# Patient Record
Sex: FEMALE | Race: WHITE | NOT HISPANIC OR LATINO | Employment: OTHER | URBAN - METROPOLITAN AREA
[De-identification: names, ages, dates, MRNs, and addresses within clinical notes are randomized per-mention and may not be internally consistent; named-entity substitution may affect disease eponyms.]

---

## 2020-09-15 ENCOUNTER — HOSPITAL ENCOUNTER (OUTPATIENT)
Dept: OTHER | Facility: HOSPITAL | Age: 35
Discharge: HOME | End: 2020-09-15
Attending: FAMILY MEDICINE

## 2020-09-15 ENCOUNTER — OFFICE VISIT CONVERTED (OUTPATIENT)
Dept: FAMILY MEDICINE CLINIC | Age: 35
End: 2020-09-15
Attending: FAMILY MEDICINE

## 2020-09-15 LAB
ALBUMIN SERPL-MCNC: 4.7 G/DL (ref 3.5–5)
ALBUMIN/GLOB SERPL: 1.7 {RATIO} (ref 1.4–2.6)
ALP SERPL-CCNC: 81 U/L (ref 42–98)
ALT SERPL-CCNC: 28 U/L (ref 10–40)
ANION GAP SERPL CALC-SCNC: 18 MMOL/L (ref 8–19)
AST SERPL-CCNC: 19 U/L (ref 15–50)
BASOPHILS # BLD MANUAL: 0.02 10*3/UL (ref 0–0.2)
BASOPHILS NFR BLD MANUAL: 0.3 % (ref 0–3)
BILIRUB SERPL-MCNC: 0.26 MG/DL (ref 0.2–1.3)
BUN SERPL-MCNC: 7 MG/DL (ref 5–25)
BUN/CREAT SERPL: 9 {RATIO} (ref 6–20)
CALCIUM SERPL-MCNC: 9.3 MG/DL (ref 8.7–10.4)
CHLORIDE SERPL-SCNC: 101 MMOL/L (ref 99–111)
CHOLEST SERPL-MCNC: 198 MG/DL (ref 107–200)
CHOLEST/HDLC SERPL: 4.4 {RATIO} (ref 3–6)
CONV CO2: 25 MMOL/L (ref 22–32)
CONV TOTAL PROTEIN: 7.4 G/DL (ref 6.3–8.2)
CREAT UR-MCNC: 0.75 MG/DL (ref 0.5–0.9)
DEPRECATED RDW RBC AUTO: 42.3 FL
EOSINOPHIL # BLD MANUAL: 0.06 10*3/UL (ref 0–0.7)
EOSINOPHIL NFR BLD MANUAL: 0.8 % (ref 0–7)
ERYTHROCYTE [DISTWIDTH] IN BLOOD BY AUTOMATED COUNT: 13 % (ref 11.5–14.5)
GFR SERPLBLD BASED ON 1.73 SQ M-ARVRAT: >60 ML/MIN/{1.73_M2}
GLOBULIN UR ELPH-MCNC: 2.7 G/DL (ref 2–3.5)
GLUCOSE SERPL-MCNC: 95 MG/DL (ref 65–99)
GRANS (ABSOLUTE): 4.53 10*3/UL (ref 2–8)
GRANS: 60.9 % (ref 30–85)
HBA1C MFR BLD: 12.8 G/DL (ref 12–16)
HCT VFR BLD AUTO: 38.4 % (ref 37–47)
HDLC SERPL-MCNC: 45 MG/DL (ref 40–60)
IMM GRANULOCYTES # BLD: 0.01 10*3/UL (ref 0–0.54)
IMM GRANULOCYTES NFR BLD: 0.1 % (ref 0–0.43)
LDLC SERPL CALC-MCNC: 134 MG/DL (ref 70–100)
LYMPHOCYTES # BLD MANUAL: 2.32 10*3/UL (ref 1–5)
LYMPHOCYTES NFR BLD MANUAL: 6.8 % (ref 3–10)
MCH RBC QN AUTO: 29.2 PG (ref 27–31)
MCHC RBC AUTO-ENTMCNC: 33.3 G/DL (ref 33–37)
MCV RBC AUTO: 87.5 FL (ref 81–99)
MONOCYTES # BLD AUTO: 0.51 10*3/UL (ref 0.2–1.2)
OSMOLALITY SERPL CALC.SUM OF ELEC: 288 MOSM/KG (ref 273–304)
PLATELET # BLD AUTO: 373 10*3/UL (ref 130–400)
PMV BLD AUTO: 10.3 FL (ref 7.4–10.4)
POTASSIUM SERPL-SCNC: 4 MMOL/L (ref 3.5–5.3)
RBC # BLD AUTO: 4.39 10*6/UL (ref 4.2–5.4)
SODIUM SERPL-SCNC: 140 MMOL/L (ref 135–147)
TRIGL SERPL-MCNC: 94 MG/DL (ref 40–150)
TSH SERPL-ACNC: 0.76 M[IU]/L (ref 0.27–4.2)
VARIANT LYMPHS NFR BLD MANUAL: 31.1 % (ref 20–45)
VLDLC SERPL-MCNC: 19 MG/DL (ref 5–37)
WBC # BLD AUTO: 7.45 10*3/UL (ref 4.8–10.8)

## 2020-12-08 ENCOUNTER — OFFICE VISIT CONVERTED (OUTPATIENT)
Dept: FAMILY MEDICINE CLINIC | Age: 35
End: 2020-12-08
Attending: FAMILY MEDICINE

## 2020-12-08 ENCOUNTER — HOSPITAL ENCOUNTER (OUTPATIENT)
Dept: OTHER | Facility: HOSPITAL | Age: 35
Discharge: HOME | End: 2020-12-08
Attending: FAMILY MEDICINE

## 2020-12-15 ENCOUNTER — HOSPITAL ENCOUNTER (OUTPATIENT)
Dept: PHYSICAL THERAPY | Facility: CLINIC | Age: 35
Setting detail: RECURRING SERIES
Discharge: HOME | End: 2021-03-03
Attending: FAMILY MEDICINE

## 2021-02-02 ENCOUNTER — OFFICE VISIT CONVERTED (OUTPATIENT)
Dept: FAMILY MEDICINE CLINIC | Age: 36
End: 2021-02-02
Attending: FAMILY MEDICINE

## 2021-05-18 NOTE — PROGRESS NOTES
Josefa Jacques  1985     Office/Outpatient Visit    Visit Date: Tue, Dec 8, 2020 12:53 pm    Provider: Rambo Puri MD (Assistant: Kavitha Baptiste MA)    Location: [facility]        Electronically signed by Rambo Puri MD on  2020 01:52:28 PM                             Subjective:        CC: Ms. Jacques is a 35 year old White female.  right shoulder pain, going down into elbow, going on 5+ months;         HPI:       Josefa presents to clinic today for evaluation of right shoulder pain.  This pain has been present for approximately 5 months but seem to be getting progressively worse.  Pain is primary located over the anterior right shoulder but does radiate anteriorly down to the right forearm, posteriorly to the elbow and posteriorly to her side/axilla.  She denies any known inciting event or injury.  No prior history of injury or trauma.  She says she is been going to the chiropractor told her she likely has bursitis.  She notes that chiropractic adjustments do seem to help her.  She intermittently takes ibuprofen for pain this is minimally helpful.  No paresthesias.  No weakness.  Pain seems to be worse at night while lying on her side.    ROS:     CONSTITUTIONAL:  Negative for chills, fatigue, fever, and weight change.      CARDIOVASCULAR:  Negative for chest pain, dizziness, palpitations and edema.      RESPIRATORY:  Negative for dyspnea and cough.      MUSCULOSKELETAL:  Positive for right shoulder pain.      INTEGUMENTARY/BREAST:  Negative for rash, breast mass and skin changes of breast.      NEUROLOGICAL:  Negative for headaches, paresthesias and weakness.          Past Medical History / Family History / Social History:         Last Reviewed on 2020 01:52 PM by Rambo Puri    Past Medical History:             PAST MEDICAL HISTORY     UNREMARKABLE Hospitalizations: Never         GYNECOLOGICAL HISTORY:        Current method of contraception is condoms;          "PREVENTIVE HEALTH MAINTENANCE             MAMMOGRAM: Done within last 2 years and results in are chart was last done  with normal results     PAP SMEAR: was last done  with normal results         Surgical History:          section: X 1; ;     breast reduction ;         Family History:     Unremarkable Father: Healthy     Mother: Healthy     Brother(s): Healthy; 2 brother(s) total     Sister(s): Healthy; 2 sister(s) total         Social History:     Occupation: [employer]     Marital Status: Single     Children: 1 child         Tobacco/Alcohol/Supplements:     Last Reviewed on 2020 01:52 PM by Rambo Puri    Tobacco: She has never smoked.          Alcohol: Frequency: Socially         Substance Abuse History:     Last Reviewed on 2020 01:52 PM by Rambo Puri        Mental Health History:     Last Reviewed on 2020 01:52 PM by Rambo Puri        Communicable Diseases (eg STDs):     Last Reviewed on 2020 01:52 PM by Rambo Puri        Current Problems:     Last Reviewed on 2020 01:52 PM by Rambo Puri    Other constipation    Chest pain, unspecified    Postnasal drip    Encounter for screening for depression    Pain in right shoulder        Immunizations:     None        Allergies:     Last Reviewed on 2020 01:52 PM by Rambo Puri    No Known Allergies.        Current Medications:     Last Reviewed on 2020 01:52 PM by Rambo Puri    None        Objective:        Vitals:         Current: 2020 1:00:00 PM    Ht:  5 ft, 4 in;  Wt: 201.2 lbs;  BMI: 34.5T: 97.8 F (temporal);  BP: 137/77 mm Hg (left arm, sitting);  P: 103 bpm (left arm (BP Cuff), sitting);  sCr: 0.75 mg/dL;  GFR: 116.98        Exams:     PHYSICAL EXAM:     GENERAL: vital signs recorded - well developed, well nourished;  no apparent distress;     EYES: conjunctiva and cornea are normal;     RESPIRATORY: Clear to auscultation bilaterally; no rales (\"crackles\") present; no rhonchi; no " wheezes;     CARDIOVASCULAR: normal rate; rhythm is regular;  No murmurs. clicks, gallops or rubs appreciated; no edema;     BREAST/INTEGUMENT: No significant rashes, lesions or suspicious moles within limits of examination;     MUSCULOSKELETAL: Tenderness to palpation of coracoid process; Right shoulder: Full range of motion in abduction, adduction, internal rotation and external rotation; mild weakness noted on strength testing in adduction and extension; speeds test equivocal; empty can test negative     NEUROLOGIC: Grossly intact; mental status: alert and oriented x 3;     PSYCHIATRIC: appropriate affect and demeanor; normal speech pattern; Normal behavior;         Assessment:         M25.511   Pain in right shoulder           ORDERS:         Radiology/Test Orders:       [id number]  Right Radiologic exam, shoulder; comp, 2 views  (Send-Out)              Procedures Ordered:       RFPT  Physical/Occupational Therapy Referral  (Send-Out)                      Plan:         Pain in right shoulder- Differential diagnosis includes but is not limited to rotator cuff tendinopathy versus rotator cuff tear (acute versus chronic) versus subacromial bursitis versus biceps tendinitis versus biceps tear (acute vs chronic). Plain films ordered today for further evaluation. PT referral placed.  Pain control with Tylenol 1000 mg 3 times daily as needed and ibuprofen 800 mg 3 times daily as needed.  If symptoms persist, will consider more advanced imaging and/or specialist referral.        RADIOLOGY:  I have ordered Shoulder x-ray: right shoulder x-ray to be done today.      REFERRALS:  Referral initiated to physical therapy.            Orders:       [id number]  Right Radiologic exam, shoulder; comp, 2 views  (Send-Out)            RFPT  Physical/Occupational Therapy Referral  (Send-Out)                  Charge Capture:         Primary Diagnosis:     M25.511  Pain in right shoulder           Orders:      [id number]  Office/outpatient visit;  established patient, level 3  (In-House)                  ADDENDUMS:      ____________________________________    Addendum: 12/10/2020 08:55 Rambo Au        Orders:    Remove  [id number]    Add  [id number]    -mja

## 2021-05-18 NOTE — PROGRESS NOTES
Josefa Jacques  1985     Office/Outpatient Visit    Visit Date:  01:29 pm    Provider: Rambo Galdamez MD (Assistant: Yenifer Stack, )    Location: [facility]        Electronically signed by Rambo Galdamez MD on  2021 02:45:56 PM                             Subjective:        CC: Ms. Jacques is a 35 year old White female.  pt feels like something is stuck in her throat;         HPI:       Since Saturday has felt like something is in her throat on right side. Notices it every time she swallows. Has only minimal congestion so doesn't think related to that. No cough, no fever. Did eat fresh on Friday that they had caught themselves, so could of have swallowed piece with bones.    ROS:     CONSTITUTIONAL:  Negative for chills, fatigue, fever, and weight change.      EYES:  Negative for blurred vision.      CARDIOVASCULAR:  Negative for chest pain, orthopnea, paroxysmal nocturnal dyspnea and pedal edema.      RESPIRATORY:  Negative for dyspnea.      GASTROINTESTINAL:  Negative for abdominal pain, constipation, diarrhea, nausea and vomiting.      GENITOURINARY:  Negative for dysuria and frequent urination.      NEUROLOGICAL:  Negative for dizziness, headaches, paresthesias, and weakness.      PSYCHIATRIC:  Negative for anxiety, depression, and sleep disturbances.          Past Medical History / Family History / Social History:         Last Reviewed on 2020 01:52 PM by Rambo Puri    Past Medical History:             PAST MEDICAL HISTORY     UNREMARKABLE Hospitalizations: Never         GYNECOLOGICAL HISTORY:        Current method of contraception is condoms;         PREVENTIVE HEALTH MAINTENANCE             MAMMOGRAM: Done within last 2 years and results in are chart was last done  with normal results     PAP SMEAR: was last done  with normal results         Surgical History:          section: X 1; ;     breast reduction ;         Family  History:     Unremarkable Father: Healthy     Mother: Healthy     Brother(s): Healthy; 2 brother(s) total     Sister(s): Healthy; 2 sister(s) total         Social History:     Occupation: [employer]     Marital Status: Single     Children: 1 child         Tobacco/Alcohol/Supplements:     Last Reviewed on 12/08/2020 01:52 PM by Rambo Puri    Tobacco: She has never smoked.          Alcohol: Frequency: Socially         Substance Abuse History:     Last Reviewed on 12/08/2020 01:52 PM by Rambo Puri        Mental Health History:     Last Reviewed on 12/08/2020 01:52 PM by Rambo Puri        Communicable Diseases (eg STDs):     Last Reviewed on 12/08/2020 01:52 PM by Rambo Puri        Allergies:     Last Reviewed on 12/08/2020 01:52 PM by Rambo Puri    No Known Allergies.        Current Medications:     Last Reviewed on 12/08/2020 01:52 PM by Rambo Puri    None        Objective:        Vitals:         Current: 2/2/2021 1:32:05 PM    Ht:  5 ft, 4 in;  Wt: 201 lbs;  BMI: 34.5T: 97.6 F (temporal);  BP: 135/81 mm Hg (right arm, sitting);  P: 85 bpm (right arm (BP Cuff), sitting);  sCr: 0.75 mg/dL;  GFR: 116.93O2 Sat: 99 % (room air)        Exams:     PHYSICAL EXAM:     GENERAL: well developed, well nourished;  well groomed;  no apparent distress;     EYES: nonicteric;     E/N/T: EARS:  normal external auditory canals and tympanic membranes;  grossly normal hearing; OROPHARYNX: posterior pharynx shows Right tonsil is enlarged, no exudate;     NECK:  supple, full ROM; no thyromegaly; no carotid bruits;     RESPIRATORY: normal appearance and symmetric expansion of chest wall; normal respiratory rate and pattern with no distress; normal breath sounds with no rales, rhonchi, wheezes or rubs;     CARDIOVASCULAR: normal rate; rhythm is regular;  no systolic murmur;     LYMPHATIC: no enlargement of cervical or facial nodes; no supraclavicular nodes;     MUSCULOSKELETAL: no pedal edema;     NEUROLOGIC: no focal  lateralizing deficits.;     PSYCHIATRIC:  appropriate affect and demeanor; normal speech pattern; grossly normal memory;         Assessment:         R13.10   Dysphagia, unspecified           ORDERS:         Procedures Ordered:       REFER  Referral to Specialist or Other Facility  (Send-Out)                      Plan:         Dysphagia, unspecifiedI wonder if she might have had a fish bone get stuck in her throat.  Will send to ENT and let them evaluate with laryngoscopy        REFERRALS:  Referral initiated to an E/N/T ( for evaluation of Globus Sensation- Possible Fish Bone ).            Orders:       REFER  Referral to Specialist or Other Facility  (Send-Out)                  Charge Capture:         Primary Diagnosis:     R13.10  Dysphagia, unspecified           Orders:      [id number]  Office/outpatient visit; established patient, level 3  (In-House)                  ADDENDUMS:      ____________________________________    Addendum: 02/04/2021 10:20 AM - Three, Team         Visit Note Faxed to:             Addendum: 02/04/2021 10:20 AM - Three, Team         Visit Note Faxed to:        Gio Parada  (Otolaryngology); Number [phone]       Gio Parada  (Otolaryngology); Number [phone]

## 2021-05-18 NOTE — PROGRESS NOTES
"Josefa Jacques  1985     Office/Outpatient Visit    Visit Date: Tue, Sep 15, 2020 02:11 pm    Provider: Rambo Puri MD (Assistant: Theresa Arrieta LPN)    Location: [facility]        Electronically signed by Rambo Puri MD on  09/15/2020 03:44:57 PM                             Subjective:        CC: Ms. Jacqeus is a 35 year old White female.  This is her first visit to the clinic.          HPI: Josefa presents clinic today to establish care.  Overall, she says she is in good health but she does have some acute complaints today.  First, she says she has been experiencing intermittent chest discomfort.  This happens randomly and resolve spontaneously.  She has no prior family history of cardiac disease.  No associated shortness of breath, diaphoresis or nausea.  She says the pain is primary located just to the left of her sternum but has occurred over the inferior sternum as well as on the right side of the chest.  No recent trauma to the area.  However, she notes that she has been moving a lot of boxes at work recently. No pleuritic chest pain.  No orthopnea. No palpitations or edema. She has noticed no association of her chest pain to stressful situations.    Second, Josefa reports that she has had a uncomfortable sensation in her anterior neck as well as a sensation like she has a \"ball\" in her throat.  She says she frequently feels a sensation that she needs to swallow her that she needs to cough something up.  She endorses feeling like her head is congested.  This mostly occurs at night.  She denies sinus congestion or headache.  She does have intermittent scantly productive cough.  No fever or chills.          PHQ-9 Depression Screening: Completed form scanned and in chart; Total Score 8     ROS:     CONSTITUTIONAL:  Negative for chills, fatigue, fever, and weight change.      EYES:  Negative for blurred vision.      E/N/T:  Positive for nasal congestion, globus sensation and " postnasal drip.   Negative for ear pain, tinnitus, frequent rhinorrhea or sinus pressure.      CARDIOVASCULAR:  Positive for chest pain ( unrelated to exertion ).   Negative for dizziness, palpitations or edema.      RESPIRATORY:  Positive for cough.   Negative for dyspnea or pleuritic chest pain.      GASTROINTESTINAL:  Negative for abdominal pain, diarrhea, heartburn, nausea and vomiting.      GENITOURINARY:  Negative for dysuria, hematuria and polyuria.      HEMATOLOGIC/LYMPHATIC:  Negative for easy bruising and excessive bleeding.      MUSCULOSKELETAL:  Negative for arthralgias and myalgias.      INTEGUMENTARY/BREAST:  Negative for rash, breast mass and skin changes of breast.      NEUROLOGICAL:  Negative for headaches, paresthesias and weakness.      ENDOCRINE:  Negative for hair loss, heat/cold intolerance, polydipsia, and polyphagia.      ALLERGIC/IMMUNOLOGIC:  Positive for seasonal allergies.      PSYCHIATRIC:  Negative for anxiety, depression, and sleep disturbances.          Past Medical History / Family History / Social History:         Last Reviewed on 9/15/2020 03:44 PM by Rambo Puri    Past Medical History:             PAST MEDICAL HISTORY     UNREMARKABLE Hospitalizations: Never         GYNECOLOGICAL HISTORY:        Current method of contraception is condoms;         PREVENTIVE HEALTH MAINTENANCE             MAMMOGRAM: Done within last 2 years and results in are chart was last done  with normal results     PAP SMEAR: was last done  with normal results         Surgical History:          section: X 1; ;     breast reduction ;         Family History:     Unremarkable Father: Healthy     Mother: Healthy     Brother(s): Healthy; 2 brother(s) total     Sister(s): Healthy; 2 sister(s) total         Social History:     Occupation: [employer]     Marital Status: Single     Children: 1 child         Tobacco/Alcohol/Supplements:     Last Reviewed on 9/15/2020 03:44 PM by Jaxson  "Rambo    Tobacco: She has never smoked.          Alcohol: Frequency: Socially         Substance Abuse History:     Last Reviewed on 9/15/2020 03:44 PM by Rambo Puri        Mental Health History:     Last Reviewed on 9/15/2020 03:44 PM by Rambo Puri        Communicable Diseases (eg STDs):     Last Reviewed on 9/15/2020 03:44 PM by Rambo Puri        Current Problems:     Last Reviewed on 9/15/2020 03:44 PM by Rambo Puri    Other constipation    Chest pain, unspecified    Postnasal drip    Encounter for screening for depression        Immunizations:     None        Allergies:     Last Reviewed on 9/15/2020 03:44 PM by Rambo Puri    No Known Allergies.        Current Medications:     Last Reviewed on 9/15/2020 03:44 PM by Rambo Puri    None        Objective:        Vitals:         Current: 9/15/2020 2:16:12 PM    Ht:  5 ft, 4 in;  Wt: 201.2 lbs;  BMI: 34.5T: 96.9 F (oral);  BP: 127/80 mm Hg (left arm, sitting);  P: 85 bpm (left arm (BP Cuff), sitting);  sCr: 0.9 mg/dL;  GFR: 97.48        Exams:     PHYSICAL EXAM:     GENERAL: vital signs recorded - well developed, well nourished;  no apparent distress;     EYES: conjunctiva and cornea are normal;     E/N/T: EARS: both TMs are have fluid behind them;  NOSE: nasal mucosa is partially obscured by clear drainage;  no sinus tenderness; OROPHARYNX: posterior pharynx shows no exudate and erythema;     NECK: trachea is midline; thyroid is non-palpable;     RESPIRATORY: Clear to auscultation bilaterally; no rales (\"crackles\") present; no rhonchi; no wheezes;     CARDIOVASCULAR: normal rate; rhythm is regular;  No murmurs. clicks, gallops or rubs appreciated; no edema;     GASTROINTESTINAL: nontender; Soft and nondistended; normal bowel sounds; no organomegaly; no masses;     LYMPHATIC: no enlargement of cervical or facial nodes; no supraclavicular nodes;     BREAST/INTEGUMENT: No significant rashes, lesions or suspicious moles within limits of examination;    "  MUSCULOSKELETAL: muscle strength: 5/5 in all major muscle groups;  normal overall tone     NEUROLOGIC: Grossly intact; mental status: alert and oriented x 3;     PSYCHIATRIC: appropriate affect and demeanor; normal speech pattern; Normal behavior;         Lab/Test Results:         LABORATORY RESULTS: EKG performed by pr         Assessment:         R07.9   Chest pain, unspecified       R09.82   Postnasal drip       Z13.31   Encounter for screening for depression           ORDERS:         Radiology/Test Orders:       [id number]  Radiologic exam chest 2 views  (Send-Out)            92710  Electrocardiogram, routine with at least 12 leads; with interpretation and report  (In-House)              Lab Orders:       [id number]  [facility] CBC with 3 part diff  (Send-Out)            16600  COMP - [facility] Comp. Metabolic Panel  (Send-Out)            65327  TSH - [facility] TSH  (Send-Out)            38890  [facility] - [facility] Lipid Panel  (Send-Out)              Other Orders:         Depression screen negative  (In-House)                      Plan:         Chest pain, unspecified- Differential diagnosis includes but is not limited to cardiac etiology versus pulmonary etiology versus costochondritis versus GERD versus esophageal spasm versus anxiety.  Cardiac etiology seems less likely as EKG is unremarkable in office today and she lacks risk factors.  At this time, costochondritis versus GERD versus anxiety seems most likely.  Chest x-ray and basic labs ordered today for further work-up. ED/return precautions given.     LABORATORY:  Labs ordered to be performed today include CBC, Comprehensive metabolic panel, lipid panel, and TSH.      RADIOLOGY:  I have ordered a chest x-ray (PA and lateral) to be done today.      TESTS/PROCEDURES:  Will proceed with an ECG to be performed/scheduled now.            Orders:       [id number]  Radiologic exam chest 2 views  (Send-Out)            72295  Electrocardiogram, routine with at least 12 leads; with  interpretation and report  (In-House)            80160  [facility] CBC with 3 part diff  (Send-Out)            36319  COMP - [facility] Comp. Metabolic Panel  (Send-Out)            93210  TSH - [facility] TSH  (Send-Out)            07585  [facility] Lipid Panel  (Send-Out)              Postnasal drip- Description of symptoms and physical examination is concerning for postnasal drip/allergic rhinitis.  Will start an over-the-counter antihistamine (Claritin) and Flonase daily.  If symptoms persist, will consider further work-up and/or referral.        Encounter for screening for depression    MIPS PHQ-9 Depression Screening: Completed form scanned and in chart; Total Score 8; Positive Depression Screen but after further evaluation the patient does not have a diagnosis of depression.            Orders:         Depression screen negative  (In-House)                  Charge Capture:         Primary Diagnosis:     R07.9  Chest pain, unspecified           Orders:      40490  Office visit - new pt, level 3  (In-House)            42404  Electrocardiogram, routine with at least 12 leads; with interpretation and report  (In-House)              R09.82  Postnasal drip     Z13.31  Encounter for screening for depression           Orders:        Depression screen negative  (In-House)

## 2021-07-02 VITALS
DIASTOLIC BLOOD PRESSURE: 80 MMHG | TEMPERATURE: 96.9 F | BODY MASS INDEX: 34.35 KG/M2 | HEIGHT: 64 IN | SYSTOLIC BLOOD PRESSURE: 127 MMHG | HEART RATE: 85 BPM | WEIGHT: 201.2 LBS

## 2021-07-02 VITALS
HEIGHT: 64 IN | OXYGEN SATURATION: 99 % | SYSTOLIC BLOOD PRESSURE: 135 MMHG | WEIGHT: 201 LBS | DIASTOLIC BLOOD PRESSURE: 81 MMHG | TEMPERATURE: 97.6 F | HEART RATE: 85 BPM | BODY MASS INDEX: 34.31 KG/M2

## 2021-07-02 VITALS
HEIGHT: 64 IN | HEART RATE: 103 BPM | DIASTOLIC BLOOD PRESSURE: 77 MMHG | SYSTOLIC BLOOD PRESSURE: 137 MMHG | WEIGHT: 201.2 LBS | TEMPERATURE: 97.8 F | BODY MASS INDEX: 34.35 KG/M2

## 2024-01-23 NOTE — PROGRESS NOTES
"Chief Complaint  Establish Care and Sore Throat (Pt c/o sore throat, headache./Onset since yesterday/)    Subjective          Josefa Jacques presents to [facility]  History of Present Illness  Patient is here to reestablish care after period of absence.    She reports that she was taking Ozempic through someone that she knew, and she stopped taking it as she was not prescribed the medication.  She reports that she felt good while taking the medication.  She did report that it caused constipation.  She went to [facility] and [place], and they recommended that she start Mounjaro.  URI   This is a new problem. The current episode started yesterday. The problem has been gradually worsening. There has been no fever. Associated symptoms include congestion, ear pain, headaches, a plugged ear sensation, sinus pain and a sore throat. Pertinent negatives include no coughing, diarrhea, nausea, rhinorrhea, sneezing, swollen glands, vomiting or wheezing. She has tried decongestant for the symptoms. The treatment provided no relief.       Objective   Vital Signs:   /76 (BP Location: Left arm, Patient Position: Sitting)   Pulse 92   Temp 98.5 °F (36.9 °C) (Oral)   Ht 162.6 cm (64\")   Wt 87.9 kg (193 lb 12.8 oz)   SpO2 98% Comment: room air  BMI 33.27 kg/m²     Physical Exam  Constitutional:       Appearance: Normal appearance.   HENT:      Head: Normocephalic.      Right Ear: Tympanic membrane, ear canal and external ear normal.      Left Ear: Tympanic membrane, ear canal and external ear normal.      Nose: Nose normal.      Right Sinus: No maxillary sinus tenderness or frontal sinus tenderness.      Left Sinus: No maxillary sinus tenderness or frontal sinus tenderness.      Mouth/Throat:      Mouth: Mucous membranes are moist.      Pharynx: Oropharynx is clear. No oropharyngeal exudate or posterior oropharyngeal erythema.   Eyes:      Conjunctiva/sclera: Conjunctivae " normal.      Pupils: Pupils are equal, round, and reactive to light.   Cardiovascular:      Rate and Rhythm: Normal rate and regular rhythm.      Pulses: Normal pulses.      Heart sounds: Normal heart sounds.   Pulmonary:      Effort: Pulmonary effort is normal.      Breath sounds: Normal breath sounds.   Musculoskeletal:      Cervical back: Normal range of motion.   Lymphadenopathy:      Cervical: No cervical adenopathy.   Neurological:      Mental Status: She is alert and oriented to person, place, and time.   Psychiatric:         Mood and Affect: Mood normal.         Behavior: Behavior normal.         Thought Content: Thought content normal.        Result Review :   The following data was reviewed by: ADY Guerrier on 01/31/2024:                  Assessment and Plan    Diagnoses and all orders for this visit:    1. Prediabetes (Primary)  Assessment & Plan:  She recently had blood work, and her A1c was 5.8.  We have discussed lifestyle modifications to lower her A1c.  She is not a candidate for the GLP-1 receptor agonist.  We have discussed lifestyle modifications to help lower the A1c.      2. Sore throat  -     POCT rapid strep A  -     POCT SARS-CoV-2 Antigen TRINO + Flu  -     Beta Strep Culture, Throat - , Throat; Future  -     Beta Strep Culture, Throat - Swab, Throat    3. Pure hypercholesterolemia  Assessment & Plan:  Her cholesterol and LDL were high.  We have discussed working on lifestyle modifications to lower her cholesterol.      4. Obesity (BMI 30-39.9)  Assessment & Plan:  Patient's (Body mass index is 33.27 kg/m².) indicates that they are obese (BMI >30) with health conditions that include dyslipidemias and prediabetes  . Weight is newly identified. BMI  is above average; BMI management plan is completed. We discussed portion control and increasing exercise.             Follow Up   Return in about 3 months (around 4/30/2024) for Annual physical.  Patient was given instructions and  counseling regarding her condition or for health maintenance advice. Please see specific information pulled into the AVS if appropriate.

## 2024-01-31 ENCOUNTER — OFFICE VISIT (OUTPATIENT)
Dept: FAMILY MEDICINE CLINIC | Age: 39
End: 2024-01-31
Payer: COMMERCIAL

## 2024-01-31 VITALS
SYSTOLIC BLOOD PRESSURE: 115 MMHG | DIASTOLIC BLOOD PRESSURE: 76 MMHG | HEART RATE: 92 BPM | HEIGHT: 64 IN | OXYGEN SATURATION: 98 % | WEIGHT: 193.8 LBS | TEMPERATURE: 98.5 F | BODY MASS INDEX: 33.09 KG/M2

## 2024-01-31 DIAGNOSIS — E78.00 PURE HYPERCHOLESTEROLEMIA: ICD-10-CM

## 2024-01-31 DIAGNOSIS — R73.03 PREDIABETES: Primary | ICD-10-CM

## 2024-01-31 DIAGNOSIS — J02.9 SORE THROAT: ICD-10-CM

## 2024-01-31 DIAGNOSIS — E66.9 OBESITY (BMI 30-39.9): ICD-10-CM

## 2024-01-31 LAB
EXPIRATION DATE: NORMAL
EXPIRATION DATE: NORMAL
FLUAV AG UPPER RESP QL IA.RAPID: NOT DETECTED
FLUBV AG UPPER RESP QL IA.RAPID: NOT DETECTED
INTERNAL CONTROL: NORMAL
INTERNAL CONTROL: NORMAL
Lab: NORMAL
Lab: NORMAL
S PYO AG THROAT QL: NEGATIVE
SARS-COV-2 AG UPPER RESP QL IA.RAPID: NOT DETECTED

## 2024-01-31 PROCEDURE — 87880 STREP A ASSAY W/OPTIC: CPT | Performed by: NURSE PRACTITIONER

## 2024-01-31 PROCEDURE — 1159F MED LIST DOCD IN RCRD: CPT | Performed by: NURSE PRACTITIONER

## 2024-01-31 PROCEDURE — 87081 CULTURE SCREEN ONLY: CPT | Performed by: NURSE PRACTITIONER

## 2024-01-31 PROCEDURE — 99214 OFFICE O/P EST MOD 30 MIN: CPT | Performed by: NURSE PRACTITIONER

## 2024-01-31 PROCEDURE — 1160F RVW MEDS BY RX/DR IN RCRD: CPT | Performed by: NURSE PRACTITIONER

## 2024-01-31 PROCEDURE — 87428 SARSCOV & INF VIR A&B AG IA: CPT | Performed by: NURSE PRACTITIONER

## 2024-01-31 NOTE — ASSESSMENT & PLAN NOTE
She recently had blood work, and her A1c was 5.8.  We have discussed lifestyle modifications to lower her A1c.  She is not a candidate for the GLP-1 receptor agonist.  We have discussed lifestyle modifications to help lower the A1c.

## 2024-01-31 NOTE — ASSESSMENT & PLAN NOTE
Her cholesterol and LDL were high.  We have discussed working on lifestyle modifications to lower her cholesterol.

## 2024-01-31 NOTE — ASSESSMENT & PLAN NOTE
Patient's (Body mass index is 33.27 kg/m².) indicates that they are obese (BMI >30) with health conditions that include dyslipidemias and prediabetes  . Weight is newly identified. BMI  is above average; BMI management plan is completed. We discussed portion control and increasing exercise.

## 2024-02-02 LAB — BACTERIA SPEC AEROBE CULT: NORMAL

## 2024-02-06 ENCOUNTER — OFFICE VISIT (OUTPATIENT)
Dept: FAMILY MEDICINE CLINIC | Age: 39
End: 2024-02-06
Payer: COMMERCIAL

## 2024-02-06 VITALS
BODY MASS INDEX: 33.39 KG/M2 | HEIGHT: 64 IN | HEART RATE: 86 BPM | OXYGEN SATURATION: 99 % | DIASTOLIC BLOOD PRESSURE: 74 MMHG | WEIGHT: 195.6 LBS | TEMPERATURE: 98.4 F | SYSTOLIC BLOOD PRESSURE: 131 MMHG

## 2024-02-06 DIAGNOSIS — J04.0 LARYNGITIS: Primary | ICD-10-CM

## 2024-02-06 PROCEDURE — 1160F RVW MEDS BY RX/DR IN RCRD: CPT | Performed by: PHYSICIAN ASSISTANT

## 2024-02-06 PROCEDURE — 1159F MED LIST DOCD IN RCRD: CPT | Performed by: PHYSICIAN ASSISTANT

## 2024-02-06 PROCEDURE — 99213 OFFICE O/P EST LOW 20 MIN: CPT | Performed by: PHYSICIAN ASSISTANT

## 2024-02-06 RX ORDER — METHYLPREDNISOLONE 4 MG/1
TABLET ORAL
Qty: 21 EACH | Refills: 0 | Status: SHIPPED | OUTPATIENT
Start: 2024-02-06

## 2024-02-06 NOTE — PROGRESS NOTES
Subjective     CHIEF COMPLAINT    Chief Complaint   Patient presents with    Hoarse     Ongoing since visit with Rubén on 24. Pt was testing for covid, flu, and strep all were negative. Pt denies testing today.             History of Present Illness  This is a 38-year-old female presenting to the clinic complaining of hoarseness to her voice for the last 4 days.  She reports feeling ill 1 week ago with sore throat, body aches and nasal congestion.  She was seen in the office at that time and tested negative for COVID, flu and strep.  She is starting to feel better but cannot talk at all.  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  She has had to miss work yesterday and today.            Review of Systems   Constitutional:  Negative for chills and fever.   HENT:  Positive for sore throat and voice change. Negative for congestion and postnasal drip.    Respiratory:  Positive for cough (productive) and wheezing. Negative for shortness of breath.    Cardiovascular:  Negative for chest pain.   Gastrointestinal:  Positive for vomiting (post tussive). Negative for diarrhea and nausea.            Past Medical History:   Diagnosis Date    Chest pain, unspecified     Dysphagia, unspecified     Other constipation     Pain in right shoulder     Postnasal drip             Past Surgical History:   Procedure Laterality Date    BILATERAL BREAST REDUCTION Bilateral 2002     SECTION  2007    X1            No family history on file.         Social History     Socioeconomic History    Marital status:     Number of children: 1   Tobacco Use    Smoking status: Never    Smokeless tobacco: Never   Vaping Use    Vaping Use: Never used   Substance and Sexual Activity    Alcohol use: Yes     Comment: SOCIALLY    Drug use: Never    Sexual activity: Defer            No Known Allergies         Current Outpatient Medications on File Prior to  "Visit   Medication Sig Dispense Refill    diphenhydrAMINE HCl (BENADRYL ALLERGY PO) Take  by mouth.      [DISCONTINUED] Pseudoephedrine HCl (SUDAFED 12 HOUR PO) Take  by mouth.       No current facility-administered medications on file prior to visit.            /74   Pulse 86   Temp 98.4 °F (36.9 °C) (Oral)   Ht 162.6 cm (64.02\")   Wt 88.7 kg (195 lb 9.6 oz)   SpO2 99% Comment: room air  BMI 33.56 kg/m²          Objective     Physical Exam  Vitals and nursing note reviewed.   Constitutional:       General: She is not in acute distress.     Appearance: Normal appearance.   HENT:      Head: Normocephalic and atraumatic.      Right Ear: Tympanic membrane, ear canal and external ear normal.      Left Ear: Tympanic membrane, ear canal and external ear normal.      Nose: No congestion or rhinorrhea.      Mouth/Throat:      Mouth: Mucous membranes are moist.      Pharynx: Oropharynx is clear. No posterior oropharyngeal erythema.      Comments: Hoarseness noted to voice.  Eyes:      Extraocular Movements: Extraocular movements intact.      Conjunctiva/sclera: Conjunctivae normal.      Pupils: Pupils are equal, round, and reactive to light.   Cardiovascular:      Rate and Rhythm: Normal rate and regular rhythm.      Heart sounds: Normal heart sounds.   Pulmonary:      Effort: Pulmonary effort is normal. No respiratory distress.      Breath sounds: Normal breath sounds. No wheezing, rhonchi or rales.   Musculoskeletal:      Cervical back: Normal range of motion. No rigidity.   Skin:     General: Skin is warm and dry.   Neurological:      Mental Status: She is alert and oriented to person, place, and time.   Psychiatric:         Mood and Affect: Mood normal.         Behavior: Behavior normal.                      Assessment & Plan  Laryngitis  Given severity of symptoms and that she is missing work, I will treat with Medrol Dosepak as below.  We also discussed supportive care including salt water gargles, warm tea " with honey, and Tylenol or ibuprofen as needed for pain.  If symptoms are worsening she should return to the clinic.                   FOR FULL DISCHARGE INSTRUCTIONS/COMMENTS/HANDOUTS please see the   AVS